# Patient Record
Sex: MALE | Race: OTHER | Employment: FULL TIME | URBAN - METROPOLITAN AREA
[De-identification: names, ages, dates, MRNs, and addresses within clinical notes are randomized per-mention and may not be internally consistent; named-entity substitution may affect disease eponyms.]

---
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
## 2018-04-05 ENCOUNTER — APPOINTMENT (OUTPATIENT)
Dept: OTHER | Facility: HOSPITAL | Age: 18
End: 2018-04-05
Attending: EMERGENCY MEDICINE

## 2019-01-21 ENCOUNTER — OFFICE VISIT (OUTPATIENT)
Dept: INTERNAL MEDICINE CLINIC | Facility: CLINIC | Age: 19
End: 2019-01-21
Payer: COMMERCIAL

## 2019-01-21 VITALS
TEMPERATURE: 101 F | DIASTOLIC BLOOD PRESSURE: 80 MMHG | HEIGHT: 69.5 IN | SYSTOLIC BLOOD PRESSURE: 131 MMHG | HEART RATE: 105 BPM | BODY MASS INDEX: 24.76 KG/M2 | WEIGHT: 171 LBS

## 2019-01-21 DIAGNOSIS — K52.9 ACUTE GASTROENTERITIS: Primary | ICD-10-CM

## 2019-01-21 PROCEDURE — 99202 OFFICE O/P NEW SF 15 MIN: CPT | Performed by: INTERNAL MEDICINE

## 2019-01-21 PROCEDURE — 99212 OFFICE O/P EST SF 10 MIN: CPT | Performed by: INTERNAL MEDICINE

## 2019-01-21 RX ORDER — ONDANSETRON 4 MG/1
4 TABLET, ORALLY DISINTEGRATING ORAL EVERY 8 HOURS PRN
Qty: 10 TABLET | Refills: 0 | Status: SHIPPED | OUTPATIENT
Start: 2019-01-21

## 2019-01-22 NOTE — PATIENT INSTRUCTIONS
Please drink plenty of fluids to avoid dehydration. Begin a bland diet when able. Use generic Zofran 4 mg 3 times daily as needed for nausea. Remain off work until symptoms better. Call if no better.

## 2019-01-22 NOTE — PROGRESS NOTES
Noe Hoyos is a 19year old male.  Patient presents with:  Vomiting  Nausea    HPI:   Since last night, he has had persistent symptoms of subjective fever with sweats and chills, achiness, generalized headache, nausea, 3 episodes of vomiting of Reji Mitch murmur  ABDOMEN: Bowel sounds normal soft nontender without mass or hepatosplenomegaly      ASSESSMENT AND PLAN:   1. Acute gastroenteritis  Likely viral syndrome with GI symptoms. Somewhat atypical for influenza which is currently endemic.   Symptoms some

## 2019-02-04 ENCOUNTER — HOSPITAL ENCOUNTER (EMERGENCY)
Facility: HOSPITAL | Age: 19
Discharge: HOME | End: 2019-02-04
Attending: EMERGENCY MEDICINE
Payer: COMMERCIAL

## 2019-02-04 ENCOUNTER — APPOINTMENT (OUTPATIENT)
Dept: CT IMAGING | Facility: HOSPITAL | Age: 19
End: 2019-02-04
Attending: EMERGENCY MEDICINE
Payer: COMMERCIAL

## 2019-02-04 VITALS
SYSTOLIC BLOOD PRESSURE: 122 MMHG | HEART RATE: 59 BPM | HEIGHT: 70 IN | TEMPERATURE: 98 F | RESPIRATION RATE: 16 BRPM | DIASTOLIC BLOOD PRESSURE: 70 MMHG | BODY MASS INDEX: 24.61 KG/M2 | OXYGEN SATURATION: 98 % | WEIGHT: 171.94 LBS

## 2019-02-04 DIAGNOSIS — G43.809 OTHER MIGRAINE WITHOUT STATUS MIGRAINOSUS, NOT INTRACTABLE: Primary | ICD-10-CM

## 2019-02-04 LAB
ANION GAP SERPL CALC-SCNC: 12 MMOL/L (ref 0–18)
BASOPHILS # BLD AUTO: 0.08 X10(3) UL (ref 0–0.2)
BASOPHILS NFR BLD AUTO: 0.5 %
BUN SERPL-MCNC: 12 MG/DL (ref 8–20)
BUN/CREAT SERPL: 14.3 (ref 10–20)
CALCIUM SERPL-MCNC: 9.5 MG/DL (ref 8.5–10.5)
CHLORIDE SERPL-SCNC: 102 MMOL/L (ref 95–110)
CO2 SERPL-SCNC: 24 MMOL/L (ref 22–32)
CREAT SERPL-MCNC: 0.84 MG/DL (ref 0.5–1.5)
DEPRECATED RDW RBC AUTO: 38.5 FL (ref 35.1–46.3)
EOSINOPHIL # BLD AUTO: 0.05 X10(3) UL (ref 0–0.7)
EOSINOPHIL NFR BLD AUTO: 0.3 %
ERYTHROCYTE [DISTWIDTH] IN BLOOD BY AUTOMATED COUNT: 11.9 % (ref 11–15)
GLUCOSE SERPL-MCNC: 122 MG/DL (ref 70–99)
HCT VFR BLD AUTO: 45 % (ref 39–53)
HGB BLD-MCNC: 15 G/DL (ref 13–17.5)
IMM GRANULOCYTES # BLD AUTO: 0.1 X10(3) UL (ref 0–1)
IMM GRANULOCYTES NFR BLD: 0.6 %
LYMPHOCYTES # BLD AUTO: 1.31 X10(3) UL (ref 1–4)
LYMPHOCYTES NFR BLD AUTO: 7.8 %
MCH RBC QN AUTO: 29.5 PG (ref 26–34)
MCHC RBC AUTO-ENTMCNC: 33.3 G/DL (ref 31–37)
MCV RBC AUTO: 88.6 FL (ref 80–100)
MONOCYTES # BLD AUTO: 0.73 X10(3) UL (ref 0.1–1)
MONOCYTES NFR BLD AUTO: 4.4 %
NEUTROPHILS # BLD AUTO: 14.46 X10 (3) UL (ref 1.5–7.7)
NEUTROPHILS # BLD AUTO: 14.46 X10(3) UL (ref 1.5–7.7)
NEUTROPHILS NFR BLD AUTO: 86.4 %
OSMOLALITY UR CALC.SUM OF ELEC: 287 MOSM/KG (ref 275–295)
PLATELET # BLD AUTO: 288 10(3)UL (ref 150–450)
POTASSIUM SERPL-SCNC: 3.6 MMOL/L (ref 3.3–5.1)
RBC # BLD AUTO: 5.08 X10(6)UL (ref 4.3–5.7)
SODIUM SERPL-SCNC: 138 MMOL/L (ref 136–144)
WBC # BLD AUTO: 16.7 X10(3) UL (ref 4–11)

## 2019-02-04 PROCEDURE — 96374 THER/PROPH/DIAG INJ IV PUSH: CPT

## 2019-02-04 PROCEDURE — 96375 TX/PRO/DX INJ NEW DRUG ADDON: CPT

## 2019-02-04 PROCEDURE — 70450 CT HEAD/BRAIN W/O DYE: CPT | Performed by: EMERGENCY MEDICINE

## 2019-02-04 PROCEDURE — 99284 EMERGENCY DEPT VISIT MOD MDM: CPT

## 2019-02-04 PROCEDURE — 80048 BASIC METABOLIC PNL TOTAL CA: CPT | Performed by: EMERGENCY MEDICINE

## 2019-02-04 PROCEDURE — 96361 HYDRATE IV INFUSION ADD-ON: CPT

## 2019-02-04 PROCEDURE — 85025 COMPLETE CBC W/AUTO DIFF WBC: CPT | Performed by: EMERGENCY MEDICINE

## 2019-02-04 RX ORDER — METOCLOPRAMIDE HYDROCHLORIDE 5 MG/ML
10 INJECTION INTRAMUSCULAR; INTRAVENOUS ONCE
Status: COMPLETED | OUTPATIENT
Start: 2019-02-04 | End: 2019-02-04

## 2019-02-04 RX ORDER — DIPHENHYDRAMINE HYDROCHLORIDE 50 MG/ML
25 INJECTION INTRAMUSCULAR; INTRAVENOUS ONCE
Status: COMPLETED | OUTPATIENT
Start: 2019-02-04 | End: 2019-02-04

## 2019-02-04 RX ORDER — ACETAMINOPHEN, ASPIRIN AND CAFFEINE 250; 250; 65 MG/1; MG/1; MG/1
2 TABLET, FILM COATED ORAL EVERY 6 HOURS PRN
Qty: 30 TABLET | Refills: 0 | Status: SHIPPED | OUTPATIENT
Start: 2019-02-04

## 2019-02-05 NOTE — ED INITIAL ASSESSMENT (HPI)
Pt to ER with c/o right sided headache that started 2 hours prior to arrival. Pt denies fever. Pt denies visual changes. Pt denies fall or injury. Pt is alert and oriented x4. Pt ambulating with steady gait. Speech clear. Face symmetrical. Tongue midline.

## 2019-02-05 NOTE — ED PROVIDER NOTES
Patient Seen in: [facility] AND [facility] Emergency Department    History   Patient presents with:  Headache (neurologic)    Stated Complaint: right sided headache     HPI    70-year-old male with no significant past medical history presents to the emergency de of motion. Neck supple. No tracheal deviation present. CV: s1s2+, RRR, no m/r/g, normal distal pulses  Pulmonary/Chest: CTA b/l with no rales, wheezes. No chest wall tenderness  Abdominal: Nontender. Nondistended. Soft.  Bowel sounds are normal.   Back: Clinical Impression:  Other migraine without status migrainosus, not intractable  (primary encounter diagnosis)    Disposition:  Discharge  2/4/2019  9:47 pm    Follow-up:  Janelle Hyatt MD  [address] [place] [address]

## 2020-02-24 ENCOUNTER — APPOINTMENT (OUTPATIENT)
Dept: LAB | Age: 20
End: 2020-02-24
Attending: FAMILY MEDICINE
Payer: COMMERCIAL

## 2020-02-24 ENCOUNTER — OFFICE VISIT (OUTPATIENT)
Dept: FAMILY MEDICINE CLINIC | Facility: CLINIC | Age: 20
End: 2020-02-24
Payer: COMMERCIAL

## 2020-02-24 VITALS
DIASTOLIC BLOOD PRESSURE: 80 MMHG | BODY MASS INDEX: 25.34 KG/M2 | HEIGHT: 71 IN | HEART RATE: 67 BPM | WEIGHT: 181 LBS | TEMPERATURE: 98 F | SYSTOLIC BLOOD PRESSURE: 123 MMHG

## 2020-02-24 DIAGNOSIS — Z00.00 ANNUAL PHYSICAL EXAM: Primary | ICD-10-CM

## 2020-02-24 DIAGNOSIS — Z84.89 FAMILY HISTORY OF EARLY SUDDEN DEATH: ICD-10-CM

## 2020-02-24 LAB
ALBUMIN SERPL-MCNC: 4.1 G/DL (ref 3.4–5)
ALBUMIN/GLOB SERPL: 1.2 {RATIO} (ref 1–2)
ALP LIVER SERPL-CCNC: 66 U/L (ref 45–117)
ALT SERPL-CCNC: 17 U/L (ref 16–61)
ANION GAP SERPL CALC-SCNC: 3 MMOL/L (ref 0–18)
AST SERPL-CCNC: 12 U/L (ref 15–37)
BASOPHILS # BLD AUTO: 0.09 X10(3) UL (ref 0–0.2)
BASOPHILS NFR BLD AUTO: 1.2 %
BILIRUB SERPL-MCNC: 0.5 MG/DL (ref 0.1–2)
BUN BLD-MCNC: 10 MG/DL (ref 7–18)
BUN/CREAT SERPL: 9.7 (ref 10–20)
CALCIUM BLD-MCNC: 9.4 MG/DL (ref 8.5–10.1)
CHLORIDE SERPL-SCNC: 108 MMOL/L (ref 98–112)
CHOLEST SMN-MCNC: 188 MG/DL (ref ?–200)
CO2 SERPL-SCNC: 30 MMOL/L (ref 21–32)
CREAT BLD-MCNC: 1.03 MG/DL (ref 0.7–1.3)
DEPRECATED RDW RBC AUTO: 39.8 FL (ref 35.1–46.3)
EOSINOPHIL # BLD AUTO: 0.17 X10(3) UL (ref 0–0.7)
EOSINOPHIL NFR BLD AUTO: 2.4 %
ERYTHROCYTE [DISTWIDTH] IN BLOOD BY AUTOMATED COUNT: 12.2 % (ref 11–15)
GLOBULIN PLAS-MCNC: 3.5 G/DL (ref 2.8–4.4)
GLUCOSE BLD-MCNC: 96 MG/DL (ref 70–99)
HCT VFR BLD AUTO: 47.6 % (ref 39–53)
HDLC SERPL-MCNC: 57 MG/DL (ref 40–59)
HGB BLD-MCNC: 15.8 G/DL (ref 13–17.5)
IMM GRANULOCYTES # BLD AUTO: 0.07 X10(3) UL (ref 0–1)
IMM GRANULOCYTES NFR BLD: 1 %
LDLC SERPL CALC-MCNC: 113 MG/DL (ref ?–100)
LYMPHOCYTES # BLD AUTO: 2.11 X10(3) UL (ref 1–4)
LYMPHOCYTES NFR BLD AUTO: 29.2 %
M PROTEIN MFR SERPL ELPH: 7.6 G/DL (ref 6.4–8.2)
MCH RBC QN AUTO: 29.6 PG (ref 26–34)
MCHC RBC AUTO-ENTMCNC: 33.2 G/DL (ref 31–37)
MCV RBC AUTO: 89.3 FL (ref 80–100)
MONOCYTES # BLD AUTO: 0.48 X10(3) UL (ref 0.1–1)
MONOCYTES NFR BLD AUTO: 6.6 %
NEUTROPHILS # BLD AUTO: 4.31 X10 (3) UL (ref 1.5–7.7)
NEUTROPHILS # BLD AUTO: 4.31 X10(3) UL (ref 1.5–7.7)
NEUTROPHILS NFR BLD AUTO: 59.6 %
NONHDLC SERPL-MCNC: 131 MG/DL (ref ?–130)
OSMOLALITY SERPL CALC.SUM OF ELEC: 291 MOSM/KG (ref 275–295)
PATIENT FASTING Y/N/NP: YES
PATIENT FASTING Y/N/NP: YES
PLATELET # BLD AUTO: 283 10(3)UL (ref 150–450)
POTASSIUM SERPL-SCNC: 4.5 MMOL/L (ref 3.5–5.1)
RBC # BLD AUTO: 5.33 X10(6)UL (ref 4.3–5.7)
SODIUM SERPL-SCNC: 141 MMOL/L (ref 136–145)
TRIGL SERPL-MCNC: 91 MG/DL (ref 30–149)
TSI SER-ACNC: 0.85 MIU/ML (ref 0.36–3.74)
VLDLC SERPL CALC-MCNC: 18 MG/DL (ref 0–30)
WBC # BLD AUTO: 7.2 X10(3) UL (ref 4–11)

## 2020-02-24 PROCEDURE — 85025 COMPLETE CBC W/AUTO DIFF WBC: CPT | Performed by: FAMILY MEDICINE

## 2020-02-24 PROCEDURE — 84443 ASSAY THYROID STIM HORMONE: CPT | Performed by: FAMILY MEDICINE

## 2020-02-24 PROCEDURE — 80061 LIPID PANEL: CPT | Performed by: FAMILY MEDICINE

## 2020-02-24 PROCEDURE — 80053 COMPREHEN METABOLIC PANEL: CPT | Performed by: FAMILY MEDICINE

## 2020-02-24 PROCEDURE — 93010 ELECTROCARDIOGRAM REPORT: CPT | Performed by: FAMILY MEDICINE

## 2020-02-24 PROCEDURE — 36415 COLL VENOUS BLD VENIPUNCTURE: CPT | Performed by: FAMILY MEDICINE

## 2020-02-24 PROCEDURE — 99385 PREV VISIT NEW AGE 18-39: CPT | Performed by: FAMILY MEDICINE

## 2020-02-24 PROCEDURE — 93005 ELECTROCARDIOGRAM TRACING: CPT

## 2020-03-03 ENCOUNTER — TELEPHONE (OUTPATIENT)
Dept: FAMILY MEDICINE CLINIC | Facility: CLINIC | Age: 20
End: 2020-03-03

## 2020-03-20 ENCOUNTER — TELEPHONE (OUTPATIENT)
Dept: OTHER | Age: 20
End: 2020-03-20

## 2020-03-20 NOTE — TELEPHONE ENCOUNTER
Dr. Christiana Tellez, received a call from [facility], Lizy Rojas asking if the Echocardiogram ordered for this patient can be done at later date due to the cancellation of all non essential diagnostic testing. Please advise.

## 2020-03-27 ENCOUNTER — TELEPHONE (OUTPATIENT)
Dept: FAMILY MEDICINE CLINIC | Facility: CLINIC | Age: 20
End: 2020-03-27

## 2020-03-27 NOTE — TELEPHONE ENCOUNTER
[facility] is asking if they can postpone the echo til things are settled. Please advise. Thank you.

## 2020-11-05 NOTE — ED NOTES
Spoke with Sallie Baldwin, nursing supervisor, regarding pt's COVID screening. According to Sallie Baldwin pt would need a blocked room. Potential beds at [facility] tomorrow. Do not have permission to transfer pt out.

## 2020-11-05 NOTE — ED PROVIDER NOTES
Patient Seen in: [facility] Emergency Department    History   Patient presents with:  Eval-P    Stated Complaint: si    HPI    Patient is here with suicidal ideation and a threat to kill himself.   He states that it is the 1 year anniversary of the ED Triage Vitals [11/04/20 2000]   BP (!) 164/101   Pulse 97   Resp 18   Temp 98.9 °F (37.2 °C)   Temp src Oral   SpO2 96 %   O2 Device        Current:BP (!) 164/101   Pulse 97   Temp 98.9 °F (37.2 °C) (Oral)   Resp 18   SpO2 96%         Physical Exam DRAW LAVENDER   RAINBOW DRAW LIGHT GREEN   RAINBOW DRAW GOLD   CBC W/ DIFFERENTIAL        MDM     96% Normal  Pulse oximetry         Disposition and Plan     We recommend that you schedule follow up care with a primary care provider within the next three m
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
## 2020-11-05 NOTE — ED NOTES
Pt tells this RN he came straight to ER from an apartment building from which he lives. According to  pt was not in a holding cell prior to arrival to ER.

## 2020-11-05 NOTE — BH LEVEL OF CARE ASSESSMENT
Level of Care Assessment Note  Compiled from documentation entered  By Art Godinez 92 Questions  Why are you here?: \"I lost control of myself. \"  Precipitating Events: Pt states today is the one year anniversary of his brother's death which alre carry out this plan? (past 30 days): Yes  6. Have you ever done anything, started to do anything, or prepared to do anything to end your life? (lifetime): Yes  7.  How long ago did you do any of these?: Within the last three months  Score -  OV: 13 - High most recent incident about a year ago. Pt denies any such behavior since then.   Present Self-Injurious Behaviors Within the Last 30 Days: No    Mental Health Symptoms  Hallucination Type: No problems reported or observed  Delusions: No problems reported or times per month  Alcohol Use  Age at first use?: Past few years  Route: Oral  Average amount used? : Pt states it varies but does admit to drinking on the weekends  How long with this pattern of use?: Since beginning  Last Use?: N/A  Is your current use th Concerns r/t Abuse: No  Possible Abuse Reportable to[de-identified] Not appropriate for reporting to authorities    Patient's legal sex: male  Patient identifies as: male  Patient's birth sex: male    General Appearance  Characteristics: Good hygiene; Other (comment)(we where he would stay up for days. Pt denies any such behavior this year. Pt denies HI, current SIB, and psychosis. Consulted with ED MD and inpatient hospitalization is recommended. Risk/Protective Factors  Risk Factors: Current suicidal behavior; History

## 2020-11-06 NOTE — ED NOTES
Called back [facility]. Spoke to Diaz. They have not received the fax.   Re-faxed on  [email or web address]

## 2020-11-06 NOTE — ED NOTES
Per Dana Schwartz from [facility], MD Dion Maldonado to accept, okay to set up transfer @ 2000

## 2020-11-30 ENCOUNTER — TELEPHONE (OUTPATIENT)
Dept: FAMILY MEDICINE CLINIC | Facility: CLINIC | Age: 20
End: 2020-11-30

## 2020-11-30 NOTE — TELEPHONE ENCOUNTER
Mom-Shira (on FEMI) calling to make an appointment with Dr Alena Holman. She states her son has been depressed, quiet x 1 month. She states it's the recent anniversary of her other son's death.  Expressed to mom that we need to speak directly to the pt to further s

## 2020-12-10 ENCOUNTER — OFFICE VISIT (OUTPATIENT)
Dept: FAMILY MEDICINE CLINIC | Facility: CLINIC | Age: 20
End: 2020-12-10
Payer: COMMERCIAL

## 2020-12-10 VITALS
DIASTOLIC BLOOD PRESSURE: 83 MMHG | WEIGHT: 181 LBS | TEMPERATURE: 99 F | HEART RATE: 89 BPM | BODY MASS INDEX: 25.34 KG/M2 | SYSTOLIC BLOOD PRESSURE: 137 MMHG | HEIGHT: 71 IN

## 2020-12-10 DIAGNOSIS — Z09 FOLLOW UP: Primary | ICD-10-CM

## 2020-12-10 DIAGNOSIS — F33.41 RECURRENT MAJOR DEPRESSIVE DISORDER, IN PARTIAL REMISSION (HCC): ICD-10-CM

## 2020-12-10 DIAGNOSIS — Z84.89 FAMILY HISTORY OF EARLY SUDDEN DEATH: ICD-10-CM

## 2020-12-10 DIAGNOSIS — R42 DIZZINESS: ICD-10-CM

## 2020-12-10 DIAGNOSIS — R03.0 ELEVATED BLOOD PRESSURE READING: ICD-10-CM

## 2020-12-10 PROCEDURE — 3008F BODY MASS INDEX DOCD: CPT | Performed by: FAMILY MEDICINE

## 2020-12-10 PROCEDURE — 99214 OFFICE O/P EST MOD 30 MIN: CPT | Performed by: FAMILY MEDICINE

## 2020-12-10 PROCEDURE — 3079F DIAST BP 80-89 MM HG: CPT | Performed by: FAMILY MEDICINE

## 2020-12-10 PROCEDURE — 3075F SYST BP GE 130 - 139MM HG: CPT | Performed by: FAMILY MEDICINE

## 2020-12-11 NOTE — PROGRESS NOTES
Patient presents with:  ER F/U: seen on 11/4 @ [facility] ED   Lab: f/u last visit labs    HPI:   Sivan Arredondo is a 23year old male who presents to clinic for ER follow-up. Was seen at [facility] ED on 11/4 for suicidal ideation.   He was going through a difficult disorder, [facility]  - PSYCH referral   - [facility] NAVIGATOR    4. Dizziness  -History of SCD in immediate family. - CARD ECHO 2D DOPPLER (CPT=93306); Future    5. Family history of early sudden death  - CARD ECHO 2D DOPPLER (CPT=93306);  Future

## 2020-12-28 ENCOUNTER — HOSPITAL ENCOUNTER (OUTPATIENT)
Dept: CV DIAGNOSTICS | Age: 20
Discharge: HOME | End: 2020-12-28
Attending: FAMILY MEDICINE
Payer: COMMERCIAL

## 2020-12-28 DIAGNOSIS — R42 DIZZINESS: ICD-10-CM

## 2020-12-28 DIAGNOSIS — Z84.89 FAMILY HISTORY OF EARLY SUDDEN DEATH: ICD-10-CM

## 2020-12-28 PROCEDURE — 93306 TTE W/DOPPLER COMPLETE: CPT | Performed by: FAMILY MEDICINE

## 2022-04-04 ENCOUNTER — NURSE TRIAGE (OUTPATIENT)
Dept: FAMILY MEDICINE CLINIC | Facility: CLINIC | Age: 22
End: 2022-04-04

## 2022-04-05 ENCOUNTER — OFFICE VISIT (OUTPATIENT)
Dept: FAMILY MEDICINE CLINIC | Facility: CLINIC | Age: 22
End: 2022-04-05
Payer: COMMERCIAL

## 2022-04-05 VITALS
DIASTOLIC BLOOD PRESSURE: 73 MMHG | TEMPERATURE: 98 F | SYSTOLIC BLOOD PRESSURE: 121 MMHG | HEART RATE: 64 BPM | WEIGHT: 181 LBS | HEIGHT: 71 IN | BODY MASS INDEX: 25.34 KG/M2

## 2022-04-05 DIAGNOSIS — Z11.3 SCREEN FOR STD (SEXUALLY TRANSMITTED DISEASE): ICD-10-CM

## 2022-04-05 DIAGNOSIS — Z00.00 ENCOUNTER FOR ANNUAL HEALTH EXAMINATION: Primary | ICD-10-CM

## 2022-04-05 PROCEDURE — 3008F BODY MASS INDEX DOCD: CPT | Performed by: FAMILY MEDICINE

## 2022-04-05 PROCEDURE — 3074F SYST BP LT 130 MM HG: CPT | Performed by: FAMILY MEDICINE

## 2022-04-05 PROCEDURE — 99395 PREV VISIT EST AGE 18-39: CPT | Performed by: FAMILY MEDICINE

## 2022-04-05 PROCEDURE — 3078F DIAST BP <80 MM HG: CPT | Performed by: FAMILY MEDICINE

## 2022-04-07 LAB
ABSOLUTE BASOPHILS: 77 CELLS/UL (ref 0–200)
ABSOLUTE EOSINOPHILS: 77 CELLS/UL (ref 15–500)
ABSOLUTE LYMPHOCYTES: 2272 CELLS/UL (ref 850–3900)
ABSOLUTE MONOCYTES: 462 CELLS/UL (ref 200–950)
ABSOLUTE NEUTROPHILS: 4813 CELLS/UL (ref 1500–7800)
ALBUMIN/GLOBULIN RATIO: 1.8 (CALC) (ref 1–2.5)
ALBUMIN: 5 G/DL (ref 3.6–5.1)
ALKALINE PHOSPHATASE: 79 U/L (ref 36–130)
ALT: 29 U/L (ref 9–46)
AST: 14 U/L (ref 10–40)
BASOPHILS: 1 %
BILIRUBIN, TOTAL: 0.8 MG/DL (ref 0.2–1.2)
BUN: 10 MG/DL (ref 7–25)
CALCIUM: 10.3 MG/DL (ref 8.6–10.3)
CARBON DIOXIDE: 28 MMOL/L (ref 20–32)
CHLAMYDIA TRACHOMATIS$RNA, TMA: NOT DETECTED
CHLORIDE: 105 MMOL/L (ref 98–110)
CHOL/HDLC RATIO: 3.2 (CALC)
CHOLESTEROL, TOTAL: 142 MG/DL
CREATININE: 1.01 MG/DL (ref 0.6–1.35)
EGFR IF AFRICN AM: 121 ML/MIN/1.73M2
EGFR IF NONAFRICN AM: 104 ML/MIN/1.73M2
EOSINOPHILS: 1 %
GLOBULIN: 2.8 G/DL (CALC) (ref 1.9–3.7)
GLUCOSE: 94 MG/DL (ref 65–99)
HDL CHOLESTEROL: 45 MG/DL
HEMATOCRIT: 48.5 % (ref 38.5–50)
HEMOGLOBIN: 16.7 G/DL (ref 13.2–17.1)
LDL-CHOLESTEROL: 78 MG/DL (CALC)
LYMPHOCYTES: 29.5 %
MCH: 30.1 PG (ref 27–33)
MCHC: 34.4 G/DL (ref 32–36)
MCV: 87.5 FL (ref 80–100)
MONOCYTES: 6 %
MPV: 13 FL (ref 7.5–12.5)
NEISSERIA GONORRHOEAE$RNA, TMA: NOT DETECTED
NEUTROPHILS: 62.5 %
NON-HDL CHOLESTEROL: 97 MG/DL (CALC)
PLATELET COUNT: 290 THOUSAND/UL (ref 140–400)
POTASSIUM: 4.9 MMOL/L (ref 3.5–5.3)
PROTEIN, TOTAL: 7.8 G/DL (ref 6.1–8.1)
RDW: 12.4 % (ref 11–15)
RED BLOOD CELL COUNT: 5.54 MILLION/UL (ref 4.2–5.8)
SODIUM: 140 MMOL/L (ref 135–146)
TRIGLYCERIDES: 107 MG/DL
TSH W/REFLEX TO FT4: 2.08 MIU/L (ref 0.4–4.5)
WHITE BLOOD CELL COUNT: 7.7 THOUSAND/UL (ref 3.8–10.8)

## 2024-08-18 ENCOUNTER — HOSPITAL ENCOUNTER (OUTPATIENT)
Age: 24
Discharge: HOME | End: 2024-08-18

## 2024-08-18 VITALS
SYSTOLIC BLOOD PRESSURE: 144 MMHG | TEMPERATURE: 99 F | RESPIRATION RATE: 18 BRPM | HEIGHT: 72 IN | HEART RATE: 97 BPM | BODY MASS INDEX: 29.12 KG/M2 | DIASTOLIC BLOOD PRESSURE: 83 MMHG | OXYGEN SATURATION: 98 % | WEIGHT: 215 LBS

## 2024-08-18 DIAGNOSIS — J06.9 VIRAL UPPER RESPIRATORY ILLNESS: Primary | ICD-10-CM

## 2024-08-18 LAB — SARS-COV-2 RNA RESP QL NAA+PROBE: NOT DETECTED

## 2024-08-18 RX ORDER — BENZONATATE 200 MG/1
200 CAPSULE ORAL 3 TIMES DAILY PRN
Qty: 20 CAPSULE | Refills: 0 | Status: SHIPPED | OUTPATIENT
Start: 2024-08-18

## 2024-08-18 NOTE — ED PROVIDER NOTES
Chief Complaint   Patient presents with    Cough/URI       History obtained from: patient   services not used     HPI:     John Bolden is a 24 year old male who presents with general illness symptoms x 4 days.  Patient endorses dry cough and nasal congestion.  Patient has been taking OTC cough medicine with some relief.  Patient continues to eat and drink normally and states he is otherwise feeling well.  Denies fevers, chills, sore throat, ear pain, chest pain, shortness of breath, wheezing, abdominal pain, vomiting, diarrhea, rash.  Denies sick contacts or recent travel.    PMH  History reviewed. No pertinent past medical history.    PFSH    PFS asessment screens reviewed and agree.  Nurses notes reviewed I agree with documentation.    Family History   Problem Relation Age of Onset    Diabetes Father     Lipids Father     Heart Disorder Father     No Known Problems Brother     Hypertension Other     Glaucoma Neg     Asthma Neg     Stroke Neg     Heart Disease Neg         per NG; no family h/o CAD     Family history reviewed with patient/caregiver and is not pertinent to presenting problem.  Social History     Socioeconomic History    Marital status: Single     Spouse name: Not on file    Number of children: Not on file    Years of education: Not on file    Highest education level: Not on file   Occupational History    Not on file   Tobacco Use    Smoking status: Never    Smokeless tobacco: Never   Vaping Use    Vaping status: Never Used   Substance and Sexual Activity    Alcohol use: Yes     Comment: rare    Drug use: Yes     Types: Cannabis    Sexual activity: Not on file   Other Topics Concern    Not on file   Social History Narrative    Not on file     Social Determinants of Health     Financial Resource Strain: Not on file   Food Insecurity: Not on file   Transportation Needs: Not on file   Physical Activity: Not on file   Stress: Not on file   Social Connections: Not on file   Housing  Stability: Not on file         ROS:   Positive for stated complaint: Cough, congestion  All other systems reviewed and negative except as noted above.  Constitutional and Vital Signs Reviewed.    Physical Exam:     Findings:    /83   Pulse 97   Temp 98.9 °F (37.2 °C) (Temporal)   Resp 18   Ht 182.9 cm (6')   Wt 97.5 kg   SpO2 98%   BMI 29.16 kg/m²   GENERAL: well developed, no acute distress, non-toxic appearing   SKIN: good skin turgor, no obvious rashes  HEAD: normocephalic, atraumatic  EYES: sclera non-icteric bilaterally, conjunctiva clear bilaterally  EARS: canals clear bilaterally, TMs clear bilaterally  NOSE: nasal congestion  OROPHARYNX: MMM, pharynx clear, no exudates or swelling, uvula midline, no tongue elevation, maintaining airway and secretions  NECK: supple, no lymphadenopathy, no nuchal rigidity, no trismus, no edema, phonation normal    CARDIO: RRR, normal heart sounds   LUNGS: clear to auscultation bilaterally, no increased WOB, no rales, rhonchi, or wheezes, no coughing throughout exam  EXTREMITIES: no cyanosis or edema, LAINEZ without difficulty  NEURO: no focal deficits  PSYCH: alert and oriented x3, answering questions appropriately, mood appropriate    MDM/Assessment/Plan:   Orders for this encounter:    Orders Placed This Encounter    Rapid SARS-CoV-2 by PCR     Order Specific Question:   Release to patient     Answer:   Immediate    benzonatate 200 MG Oral Cap     Sig: Take 1 capsule (200 mg total) by mouth 3 (three) times daily as needed for cough.     Dispense:  20 capsule     Refill:  0       Labs performed this visit:  Recent Results (from the past 10 hour(s))   Rapid SARS-CoV-2 by PCR    Collection Time: 08/18/24  9:48 AM    Specimen: Nares; Other   Result Value Ref Range    Rapid SARS-CoV-2 by PCR Not Detected Not Detected       Imaging performed this visit:  No orders to display       Medical Decision Making  DDx includes viral URI versus covid versus rhinosinusitis versus  bronchitis versus other. Patient is overall very well-appearing with stable vitals and tolerating oral intake. No hypoxia or signs of respiratory distress.  COVID test negative. Discussed supportive care for suspected viral illness including rest, increased fluid intake, and OTC Tylenol/Motrin as needed for pain or fevers. Rx Tessalon for cough as needed. Discussed infection control.  Instructed patient to go directly to nearest ER with any worsening or concerning symptoms.  Follow-up with PCP.  Work note provided.    Amount and/or Complexity of Data Reviewed  Labs: ordered.    Risk  OTC drugs.  Prescription drug management.        Diagnosis:    ICD-10-CM    1. Viral upper respiratory illness  J06.9           All results reviewed and discussed with patient/patient's family. Patient/patient's family verbalize excellent understanding of instructions and feels comfortable with plan. All of patient's/patient's family's questions were addressed.   See AVS for detailed discharge instructions for your condition today.    Follow Up with:  Nilda Mead MD  [address] [place] [address]  [address]  [place] [address]  [phone]            Note: This document was dictated using Dragon medical dictation software.  Proofreading was performed to the best of my ability, but errors may be present.    Yuliana Odom PA-C

## 2024-11-12 ENCOUNTER — HOSPITAL ENCOUNTER (OUTPATIENT)
Age: 24
Discharge: HOME | End: 2024-11-12

## 2024-11-12 VITALS
OXYGEN SATURATION: 100 % | RESPIRATION RATE: 18 BRPM | HEART RATE: 80 BPM | SYSTOLIC BLOOD PRESSURE: 132 MMHG | TEMPERATURE: 99 F | DIASTOLIC BLOOD PRESSURE: 82 MMHG

## 2024-11-12 DIAGNOSIS — J02.0 STREP PHARYNGITIS: ICD-10-CM

## 2024-11-12 DIAGNOSIS — Z20.822 COVID-19 RULED OUT BY LABORATORY TESTING: Primary | ICD-10-CM

## 2024-11-12 LAB
POCT INFLUENZA A: NEGATIVE
POCT INFLUENZA B: NEGATIVE
S PYO AG THROAT QL: POSITIVE
SARS-COV-2 RNA RESP QL NAA+PROBE: NOT DETECTED

## 2024-11-12 PROCEDURE — U0002 COVID-19 LAB TEST NON-CDC: HCPCS | Performed by: NURSE PRACTITIONER

## 2024-11-12 PROCEDURE — 99213 OFFICE O/P EST LOW 20 MIN: CPT | Performed by: NURSE PRACTITIONER

## 2024-11-12 PROCEDURE — 87502 INFLUENZA DNA AMP PROBE: CPT | Performed by: NURSE PRACTITIONER

## 2024-11-12 PROCEDURE — 87880 STREP A ASSAY W/OPTIC: CPT | Performed by: NURSE PRACTITIONER

## 2024-11-12 RX ORDER — AMOXICILLIN 500 MG/1
500 TABLET, FILM COATED ORAL 2 TIMES DAILY
Qty: 20 TABLET | Refills: 0 | Status: SHIPPED | OUTPATIENT
Start: 2024-11-12 | End: 2024-11-22

## 2024-11-12 NOTE — DISCHARGE INSTRUCTIONS
Increase water intake, drink water, gatorade, and stick with a soft and liquid diet until throat is feeling better. [place] foods: avoid spicy greasy acidic or dairy foods.    Take ibuprofen 600mg every 6 hours for pain and swelling   AND/OR  Take tylenol 650-1000mg every 6 hours for fever, chills, bodyaches.    Take antibiotics prescribed, finish full course x 10 days.  -Mucinex DM for cough, mucous production as needed.  -Salt water gargles for sore throat  -Throat lozenges  -Warm tea w honey    Change toothbrush in 48 hours.  Avoid sharing utensils, cups, foods with others, avoid kissing x 72 hours while antibiotics work for infection.    RETURN OR GO TO ED for fever > 103 despite medication, difficulty swallowing saliva, shortness of breath, worsening swelling to throat that you cannot tolerate fluids.

## 2024-11-12 NOTE — ED PROVIDER NOTES
Patient Seen in: [facility]      History     Chief Complaint   Patient presents with    Cough    Sore Throat    Headache     Stated Complaint: cold    Subjective:   HPI    24 yr old male here for evaluation of cough, sore throat, headache, fatigue x 3 days. He reports vomiting, high fever Saturday that resolved. Has been having continued chills, body aches since then. Denies abd pain, diarrhea, chest pain, shortness of breath, dizziness. He has been taking theraflu and ibuprofen for symptoms at home. +sick contact at work.         Objective:     History reviewed. No pertinent past medical history.           History reviewed. No pertinent surgical history.             Social History     Socioeconomic History    Marital status: Single   Tobacco Use    Smoking status: Never    Smokeless tobacco: Never   Vaping Use    Vaping status: Never Used   Substance and Sexual Activity    Alcohol use: Yes     Comment: rare    Drug use: Yes     Types: Cannabis              Review of Systems    Positive for stated complaint: cold  Other systems are as noted in HPI.  Constitutional and vital signs reviewed.      All other systems reviewed and negative except as noted above.    Physical Exam     ED Triage Vitals   BP 11/12/24 1313 132/82   Pulse 11/12/24 1309 80   Resp 11/12/24 1309 18   Temp 11/12/24 1309 98.6 °F (37 °C)   Temp src 11/12/24 1309 Oral   SpO2 11/12/24 1309 100 %   O2 Device 11/12/24 1309 None (Room air)       Current Vitals:   Vital Signs  BP: 132/82  Pulse: 80  Resp: 18  Temp: 98.6 °F (37 °C)  Temp src: Oral    Oxygen Therapy  SpO2: 100 %  O2 Device: None (Room air)        Physical Exam  Vitals and nursing note reviewed.   Constitutional:       General: He is not in acute distress.     Appearance: He is well-developed. He is not ill-appearing, toxic-appearing or diaphoretic.   HENT:      Head: Normocephalic and atraumatic. No raccoon eyes, Mckeon's sign, right periorbital erythema or left periorbital  erythema.      Right Ear: Tympanic membrane, ear canal and external ear normal.      Left Ear: Tympanic membrane, ear canal and external ear normal.      Nose: Congestion present. No rhinorrhea.      Mouth/Throat:      Lips: Pink.      Mouth: Mucous membranes are moist. No oral lesions.      Pharynx: Oropharynx is clear. Uvula midline. Posterior oropharyngeal erythema and postnasal drip present. No pharyngeal swelling, oropharyngeal exudate or uvula swelling.      Tonsils: No tonsillar exudate or tonsillar abscesses.   Eyes:      Pupils: Pupils are equal, round, and reactive to light.   Cardiovascular:      Rate and Rhythm: Normal rate and regular rhythm.      Heart sounds: Normal heart sounds.   Pulmonary:      Effort: Pulmonary effort is normal. No respiratory distress.      Breath sounds: Normal breath sounds. No stridor. No wheezing, rhonchi or rales.   Musculoskeletal:      Cervical back: Normal range of motion and neck supple.   Lymphadenopathy:      Cervical: No cervical adenopathy.   Skin:     General: Skin is warm.      Findings: No rash.   Neurological:      Mental Status: He is alert and oriented to person, place, and time.   Psychiatric:         Mood and Affect: Mood normal.         Behavior: Behavior normal.             ED Course     Labs Reviewed   POCT RAPID STREP - Abnormal; Notable for the following components:       Result Value    POCT Rapid Strep Positive (*)     All other components within normal limits   POCT FLU TEST - Normal    Narrative:     This assay is a rapid molecular in vitro test utilizing nucleic acid amplification of influenza A and B viral RNA.   RAPID SARS-COV-2 BY PCR - Normal       ED Course as of 11/12/24 1445  ------------------------------------------------------------  Time: 11/12 1326  Value: POCT Rapid Strep(!)  Comment: (Reviewed)  ------------------------------------------------------------  Time: 11/12 1337  Value: POCT Flu Test  Comment:  (Reviewed)  ------------------------------------------------------------  Time: 11/12 1337  Value: Rapid SARS-CoV-2 by PCR  Comment: (Reviewed)              MDM     24-year-old male here for evaluation of cough sore throat headache x 3 days, patient had fever and vomiting on Saturday that resolved.  Feeling loss of appetite and continued other symptoms.    On exam patient well-appearing lungs are clear with no wheezing stridor or crackles, bilateral TM normal, pharynx with erythema, postnasal drip.    Differential diagnoses reflecting the complexity of care include but are not limited to, COVID, flu, other viral syndrome.    Comorbidities that add complexity to management include: None  History obtained by an independent source was from: Patient  My independent interpretations of studies include: Strep positive  COVID-negative  Flu negative  Shared decision making was done by: Patient, myself  Discussions of management was done with: Patient    Patient is well appearing, non-toxic and in no acute distress.  Vital signs are stable.   Discussed p.o. fluids, salt water gargles, warm teas and throat lozenges.  Discussed antibiotics for positive strep test, amoxicillin x 10 days sent to pharmacy on file.  Discussed changing toothbrush in 48 hours, ibuprofen Tylenol as needed, no sharing of foods utensils cups and kissing for 72 hrs.  All questions answered. Return and ER precautions given.    Counseled: Patient, regarding diagnosis, regarding treatment plan, regarding diagnostic results, regarding prescription, I have discussed with the patient the results of tests, differential diagnosis, and warning signs and symptoms that should prompt immediate return. The patient understands these instructions and agrees to the follow-up plan provided. There is no barriers to learning. Appropriate f/u given. Patient agrees to return for any concerns/ problems/complications.          Medical Decision Making      Disposition and Plan      Clinical Impression:  1. COVID-19 ruled out by laboratory testing    2. Strep pharyngitis         Disposition:  Discharge  11/12/2024  1:37 pm    Follow-up:  Nilda Mead MD  [address] [place] [address]  [address]  [place] [address]  [phone]      As needed          Medications Prescribed:  Discharge Medication List as of 11/12/2024  1:38 PM        START taking these medications    Details   amoxicillin 500 MG Oral Tab Take 1 tablet (500 mg total) by mouth 2 (two) times daily for 10 days., Normal, Disp-20 tablet, R-0                 Supplementary Documentation:

## 2025-03-24 ENCOUNTER — LAB ENCOUNTER (OUTPATIENT)
Dept: LAB | Age: 25
End: 2025-03-24
Attending: FAMILY MEDICINE

## 2025-03-24 ENCOUNTER — OFFICE VISIT (OUTPATIENT)
Dept: FAMILY MEDICINE CLINIC | Facility: CLINIC | Age: 25
End: 2025-03-24

## 2025-03-24 VITALS
WEIGHT: 211 LBS | BODY MASS INDEX: 29 KG/M2 | HEART RATE: 58 BPM | SYSTOLIC BLOOD PRESSURE: 131 MMHG | DIASTOLIC BLOOD PRESSURE: 74 MMHG

## 2025-03-24 DIAGNOSIS — Z11.3 SCREEN FOR STD (SEXUALLY TRANSMITTED DISEASE): ICD-10-CM

## 2025-03-24 DIAGNOSIS — Z00.00 ENCOUNTER FOR ANNUAL HEALTH EXAMINATION: Primary | ICD-10-CM

## 2025-03-24 LAB
ALBUMIN SERPL-MCNC: 5 G/DL (ref 3.2–4.8)
ALBUMIN/GLOB SERPL: 1.9 {RATIO} (ref 1–2)
ALP LIVER SERPL-CCNC: 85 U/L
ALT SERPL-CCNC: 31 U/L
ANION GAP SERPL CALC-SCNC: 9 MMOL/L (ref 0–18)
AST SERPL-CCNC: 19 U/L (ref ?–34)
BASOPHILS # BLD AUTO: 0.07 X10(3) UL (ref 0–0.2)
BASOPHILS NFR BLD AUTO: 0.9 %
BILIRUB SERPL-MCNC: 0.5 MG/DL (ref 0.3–1.2)
BUN BLD-MCNC: 17 MG/DL (ref 9–23)
BUN/CREAT SERPL: 15 (ref 10–20)
CALCIUM BLD-MCNC: 9.7 MG/DL (ref 8.7–10.4)
CHLORIDE SERPL-SCNC: 108 MMOL/L (ref 98–112)
CHOLEST SERPL-MCNC: 230 MG/DL (ref ?–200)
CO2 SERPL-SCNC: 27 MMOL/L (ref 21–32)
CREAT BLD-MCNC: 1.13 MG/DL
DEPRECATED RDW RBC AUTO: 40.3 FL (ref 35.1–46.3)
EGFRCR SERPLBLD CKD-EPI 2021: 92 ML/MIN/1.73M2 (ref 60–?)
EOSINOPHIL # BLD AUTO: 0.15 X10(3) UL (ref 0–0.7)
EOSINOPHIL NFR BLD AUTO: 1.9 %
ERYTHROCYTE [DISTWIDTH] IN BLOOD BY AUTOMATED COUNT: 12.2 % (ref 11–15)
EST. AVERAGE GLUCOSE BLD GHB EST-MCNC: 117 MG/DL (ref 68–126)
FASTING PATIENT LIPID ANSWER: YES
FASTING STATUS PATIENT QL REPORTED: YES
GLOBULIN PLAS-MCNC: 2.6 G/DL (ref 2–3.5)
GLUCOSE BLD-MCNC: 104 MG/DL (ref 70–99)
HBA1C MFR BLD: 5.7 % (ref ?–5.7)
HCT VFR BLD AUTO: 48.2 %
HDLC SERPL-MCNC: 60 MG/DL (ref 40–59)
HGB BLD-MCNC: 15.9 G/DL
IMM GRANULOCYTES # BLD AUTO: 0.06 X10(3) UL (ref 0–1)
IMM GRANULOCYTES NFR BLD: 0.8 %
LDLC SERPL CALC-MCNC: 132 MG/DL (ref ?–100)
LYMPHOCYTES # BLD AUTO: 2.98 X10(3) UL (ref 1–4)
LYMPHOCYTES NFR BLD AUTO: 37.7 %
MCH RBC QN AUTO: 29.9 PG (ref 26–34)
MCHC RBC AUTO-ENTMCNC: 33 G/DL (ref 31–37)
MCV RBC AUTO: 90.8 FL
MONOCYTES # BLD AUTO: 0.62 X10(3) UL (ref 0.1–1)
MONOCYTES NFR BLD AUTO: 7.8 %
NEUTROPHILS # BLD AUTO: 4.03 X10 (3) UL (ref 1.5–7.7)
NEUTROPHILS # BLD AUTO: 4.03 X10(3) UL (ref 1.5–7.7)
NEUTROPHILS NFR BLD AUTO: 50.9 %
NONHDLC SERPL-MCNC: 170 MG/DL (ref ?–130)
OSMOLALITY SERPL CALC.SUM OF ELEC: 300 MOSM/KG (ref 275–295)
PLATELET # BLD AUTO: 263 10(3)UL (ref 150–450)
POTASSIUM SERPL-SCNC: 4.5 MMOL/L (ref 3.5–5.1)
PROT SERPL-MCNC: 7.6 G/DL (ref 5.7–8.2)
RBC # BLD AUTO: 5.31 X10(6)UL
SODIUM SERPL-SCNC: 144 MMOL/L (ref 136–145)
T PALLIDUM AB SER QL IA: NONREACTIVE
TRIGL SERPL-MCNC: 213 MG/DL (ref 30–149)
TSI SER-ACNC: 4.07 UIU/ML (ref 0.55–4.78)
VLDLC SERPL CALC-MCNC: 39 MG/DL (ref 0–30)
WBC # BLD AUTO: 7.9 X10(3) UL (ref 4–11)

## 2025-03-24 PROCEDURE — 85025 COMPLETE CBC W/AUTO DIFF WBC: CPT | Performed by: FAMILY MEDICINE

## 2025-03-24 PROCEDURE — 80053 COMPREHEN METABOLIC PANEL: CPT | Performed by: FAMILY MEDICINE

## 2025-03-24 PROCEDURE — 84443 ASSAY THYROID STIM HORMONE: CPT | Performed by: FAMILY MEDICINE

## 2025-03-24 PROCEDURE — 87389 HIV-1 AG W/HIV-1&-2 AB AG IA: CPT | Performed by: FAMILY MEDICINE

## 2025-03-24 PROCEDURE — 87591 N.GONORRHOEAE DNA AMP PROB: CPT | Performed by: FAMILY MEDICINE

## 2025-03-24 PROCEDURE — 36415 COLL VENOUS BLD VENIPUNCTURE: CPT | Performed by: FAMILY MEDICINE

## 2025-03-24 PROCEDURE — 83036 HEMOGLOBIN GLYCOSYLATED A1C: CPT | Performed by: FAMILY MEDICINE

## 2025-03-24 PROCEDURE — 99395 PREV VISIT EST AGE 18-39: CPT | Performed by: FAMILY MEDICINE

## 2025-03-24 PROCEDURE — 87491 CHLMYD TRACH DNA AMP PROBE: CPT | Performed by: FAMILY MEDICINE

## 2025-03-24 PROCEDURE — 80061 LIPID PANEL: CPT | Performed by: FAMILY MEDICINE

## 2025-03-24 PROCEDURE — 86780 TREPONEMA PALLIDUM: CPT | Performed by: FAMILY MEDICINE

## 2025-03-24 NOTE — PROGRESS NOTES
John Bolden is a 24 year old male who presents for a complete physical exam.   HPI:       Wt Readings from Last 3 Encounters:   03/24/25 211 lb (95.7 kg)   08/18/24 215 lb (97.5 kg)   04/05/22 181 lb (82.1 kg)     Body mass index is 28.62 kg/m².     Current Outpatient Medications   Medication Sig Dispense Refill    benzonatate 200 MG Oral Cap Take 1 capsule (200 mg total) by mouth 3 (three) times daily as needed for cough. (Patient not taking: Reported on 3/24/2025) 20 capsule 0    aspirin-acetaminophen-caffeine 250-250-65 MG Oral Tab Take 2 tablets by mouth every 6 (six) hours as needed for Pain. (Patient not taking: Reported on 2/24/2020) 30 tablet 0    ondansetron 4 MG Oral Tablet Dispersible Take 1 tablet (4 mg total) by mouth every 8 (eight) hours as needed for Nausea. (Patient not taking: Reported on 2/24/2020) 10 tablet 0      History reviewed. No pertinent past medical history.   History reviewed. No pertinent surgical history.   Family History   Problem Relation Age of Onset    Diabetes Father     Lipids Father     Heart Disorder Father     No Known Problems Brother     Hypertension Other     Glaucoma Neg     Asthma Neg     Stroke Neg     Heart Disease Neg         per NG; no family h/o CAD      Social History:  Social History     Socioeconomic History    Marital status: Single   Tobacco Use    Smoking status: Never     Passive exposure: Never    Smokeless tobacco: Never   Vaping Use    Vaping status: Never Used   Substance and Sexual Activity    Alcohol use: Yes     Comment: rare    Drug use: Yes     Types: Cannabis           REVIEW OF SYSTEMS:   Negative, except per HPI.     EXAM:   /74 (BP Location: Left arm, Patient Position: Sitting, Cuff Size: large)   Pulse 58   Wt 211 lb (95.7 kg)   BMI 28.62 kg/m²     GENERAL: well developed, well nourished, in no apparent distress  SKIN: no rashes, no suspicious lesions  HEENT: atraumatic, normocephalic, ears are clear  EYES: PERRLA, EOMI, conjunctiva  are clear  NECK: supple, no adenopathy  LUNGS: clear to auscultation  CARDIO: RRR without murmur  GI: good BS, no masses, HSM or tenderness  MUSCULOSKELETAL: back is not tender, FROM of the back  EXTREMITIES: no cyanosis, clubbing or edema  NEURO: Oriented times three, cranial nerves are intact, motor and sensory are grossly intact    ASSESSMENT AND PLAN:   John Bolden is a 24 year old male who presents for a complete physical exam.    1. Encounter for annual health examination  -Medical, surgical and social history, as well as medications and allergies were reviewed with patient.  - CBC With Differential With Platelet  - Comp Metabolic Panel (14)  - TSH W Reflex To Free T4  - Hemoglobin A1C  - Lipid Panel  - HIV AG AB Combo  - T PALLIDUM SCREENING CASCADE  - Chlamydia/Gc Amplification    2. Screen for STD (sexually transmitted disease)    - HIV AG AB Combo  - T PALLIDUM SCREENING CASCADE  - Chlamydia/Gc Amplification         Nilda Mead MD  3/24/2025  11:32 AM

## 2025-03-25 LAB
C TRACH DNA SPEC QL NAA+PROBE: NEGATIVE
N GONORRHOEA DNA SPEC QL NAA+PROBE: NEGATIVE

## (undated) NOTE — LETTER
Date & Time: 11/12/2024, 1:38 PM  Patient: John Bolden  Encounter Provider(s):    Lissette George APRN       To Whom It May Concern:    John Bolden was seen and treated in our department on 11/12/2024. He should not return to work until 11/14/2024 .    If you have any questions or concerns, please do not hesitate to call.        _____________________________  Physician/APC Signature

## (undated) NOTE — Clinical Note
2017              John [address] 81         2000         Immunization History   Administered Date(s) Administered   • DTAP 1998, 1998, 1998, 2000, 2003   • [facility]

## (undated) NOTE — Clinical Note
[facility] [employer] of Child Health Examination       Student's Name  John Bolden Title                           Date    (If adding dates to the above immunization history section, put your initials by date(s) and sign here.)   ALTERNATIVE PROOF OF IMMUNITY   1 Diagnosis of asthma? Child wakes during the night coughing   Yes       No    Yes       No    Loss of function of one of paired organs? (eye/ear/kidney/testicle)   Yes       No      Birth Defects? Developmental delay?    Yes       No    Yes       No  Hospi Family History    Yes               Ethnic Minority  Yes                           Signs of Insulin Resistance (hypertension, dyslipidemia, polycystic ovarian syndrome, acanthosis nigricans)             No              At Risk  Yes   Lead Risk Ashlyn Henry Controller medication (e.g. inhaled corticosteroid):   No Other   NEEDS/MODIFICATIONS required in the school setting  None DIETARY Needs/Restrictions     None   SPECIAL INSTRUCTIONS/DEVICES e.g. safety glasses, glass eye, chest protector for arrhyt

## (undated) NOTE — LETTER
Date & Time: 8/18/2024, 10:10 AM  Patient: John Bolden  Encounter Provider(s):    Yuliana Odom PA-C       To Whom It May Concern:    John Bolden was seen and treated in our department on 8/18/2024. He can return to work 8/20/2024.    If you have any questions or concerns, please do not hesitate to call.        _____________________________  Physician/APC Signature

## (undated) NOTE — ED AVS SNAPSHOT
Laureano Delong   MRN: [id number]    Department:  [facility] Emergency Department   Date of Visit:  2/4/2019           Disclosure     Insurance plans vary and the physician(s) referred by the ER may not be covered by your plan.  Please contact CARE PHYSICIAN AT ONCE OR RETURN IMMEDIATELY TO THE EMERGENCY DEPARTMENT. If you have been prescribed any medication(s), please fill your prescription right away and begin taking the medication(s) as directed.   If you believe that any of the medications

## (undated) NOTE — LETTER
1/21/2019              John Art 81         To Whom It May Concern,    Mr. Gonzalo Miguel saw me this evening in my office with symptoms of viral gastroenteritis, with fever nausea vomiting and

## (undated) NOTE — MR AVS SNAPSHOT
[facility]  [address]  [phone]               Thank you for choosing us for your health care visit with Danika Patel MD.  We are glad to serve you and happy to provide you with this summar *Growth percentiles are based on CDC 2-20 Years data     BP percentiles are based on 2000 NHANES data         Current Medications          This list is accurate as of: 4/25/17  1:20 PM.  Always use your most recent med list.                Clindamycin Rebeka